# Patient Record
Sex: MALE | URBAN - METROPOLITAN AREA
[De-identification: names, ages, dates, MRNs, and addresses within clinical notes are randomized per-mention and may not be internally consistent; named-entity substitution may affect disease eponyms.]

---

## 2021-04-23 ENCOUNTER — OFFICE VISIT (OUTPATIENT)
Dept: URBAN - METROPOLITAN AREA CLINIC 56 | Facility: CLINIC | Age: 56
End: 2021-04-23
Payer: COMMERCIAL

## 2021-04-23 DIAGNOSIS — E11.9 TYPE 2 DIABETES MELLITUS W/O COMPLICATION: Primary | ICD-10-CM

## 2021-04-23 DIAGNOSIS — Z79.84 LONG TERM (CURRENT) USE OF ORAL ANTIDIABETIC DRUGS: ICD-10-CM

## 2021-04-23 DIAGNOSIS — H52.4 PRESBYOPIA: ICD-10-CM

## 2021-04-23 DIAGNOSIS — H25.13 AGE-RELATED NUCLEAR CATARACT, BILATERAL: ICD-10-CM

## 2021-04-23 PROCEDURE — 92004 COMPRE OPH EXAM NEW PT 1/>: CPT | Performed by: STUDENT IN AN ORGANIZED HEALTH CARE EDUCATION/TRAINING PROGRAM

## 2021-04-23 PROCEDURE — 92134 CPTRZ OPH DX IMG PST SGM RTA: CPT | Performed by: STUDENT IN AN ORGANIZED HEALTH CARE EDUCATION/TRAINING PROGRAM

## 2021-04-23 PROCEDURE — 92250 FUNDUS PHOTOGRAPHY W/I&R: CPT | Performed by: STUDENT IN AN ORGANIZED HEALTH CARE EDUCATION/TRAINING PROGRAM

## 2021-04-23 ASSESSMENT — KERATOMETRY
OD: 44.85
OS: 44.89

## 2021-04-23 ASSESSMENT — INTRAOCULAR PRESSURE
OD: 13
OS: 13

## 2021-04-23 ASSESSMENT — VISUAL ACUITY
OD: 20/20
OS: 20/20

## 2021-04-23 NOTE — IMPRESSION/PLAN
Impression: Type 2 diabetes mellitus w/o complication: Q70.7. Plan: Patient educated on condition, importance of diet, exercise, and regular follow ups with PCP. No NPDR or DME present, good blood sugar control continue to monitor with annual DM DFE.

## 2021-04-23 NOTE — IMPRESSION/PLAN
Impression: Presbyopia: H52.4. Plan: Patient educated on condition, updated and released SRx. Monitor.